# Patient Record
Sex: FEMALE | Race: WHITE | URBAN - METROPOLITAN AREA
[De-identification: names, ages, dates, MRNs, and addresses within clinical notes are randomized per-mention and may not be internally consistent; named-entity substitution may affect disease eponyms.]

---

## 2021-09-24 ENCOUNTER — TELEPHONE (OUTPATIENT)
Dept: ONCOLOGY | Age: 66
End: 2021-09-24

## 2021-09-24 NOTE — TELEPHONE ENCOUNTER
Lm with patient to let her know her appointment has been moved to 9/29/21 at 3:00pm per Dr. Sun Nolan. Gave her office number to call back with any questions.

## 2021-09-28 ENCOUNTER — TELEPHONE (OUTPATIENT)
Dept: ONCOLOGY | Age: 66
End: 2021-09-28

## 2021-09-28 NOTE — TELEPHONE ENCOUNTER
Lm with patient to let her know her appointment has been rescheduled for 10/12/21 at 1:00pm per Dr. Nevaeh Figueroa. Gave her office number to call back with any questions.

## 2021-10-12 ENCOUNTER — OFFICE VISIT (OUTPATIENT)
Dept: ONCOLOGY | Age: 66
End: 2021-10-12
Payer: MEDICARE

## 2021-10-12 VITALS
RESPIRATION RATE: 18 BRPM | HEIGHT: 65 IN | DIASTOLIC BLOOD PRESSURE: 94 MMHG | OXYGEN SATURATION: 98 % | WEIGHT: 171 LBS | BODY MASS INDEX: 28.49 KG/M2 | SYSTOLIC BLOOD PRESSURE: 179 MMHG | HEART RATE: 70 BPM | TEMPERATURE: 98 F

## 2021-10-12 DIAGNOSIS — C50.911 MALIGNANT NEOPLASM OF RIGHT BREAST IN FEMALE, ESTROGEN RECEPTOR POSITIVE, UNSPECIFIED SITE OF BREAST (HCC): Primary | ICD-10-CM

## 2021-10-12 DIAGNOSIS — Z17.0 MALIGNANT NEOPLASM OF RIGHT BREAST IN FEMALE, ESTROGEN RECEPTOR POSITIVE, UNSPECIFIED SITE OF BREAST (HCC): Primary | ICD-10-CM

## 2021-10-12 PROCEDURE — G8427 DOCREV CUR MEDS BY ELIG CLIN: HCPCS | Performed by: INTERNAL MEDICINE

## 2021-10-12 PROCEDURE — 99205 OFFICE O/P NEW HI 60 MIN: CPT | Performed by: INTERNAL MEDICINE

## 2021-10-12 PROCEDURE — 1101F PT FALLS ASSESS-DOCD LE1/YR: CPT | Performed by: INTERNAL MEDICINE

## 2021-10-12 PROCEDURE — 3017F COLORECTAL CA SCREEN DOC REV: CPT | Performed by: INTERNAL MEDICINE

## 2021-10-12 PROCEDURE — G8510 SCR DEP NEG, NO PLAN REQD: HCPCS | Performed by: INTERNAL MEDICINE

## 2021-10-12 PROCEDURE — G8419 CALC BMI OUT NRM PARAM NOF/U: HCPCS | Performed by: INTERNAL MEDICINE

## 2021-10-12 PROCEDURE — G8536 NO DOC ELDER MAL SCRN: HCPCS | Performed by: INTERNAL MEDICINE

## 2021-10-12 PROCEDURE — G8400 PT W/DXA NO RESULTS DOC: HCPCS | Performed by: INTERNAL MEDICINE

## 2021-10-12 PROCEDURE — 1090F PRES/ABSN URINE INCON ASSESS: CPT | Performed by: INTERNAL MEDICINE

## 2021-10-12 PROCEDURE — G0463 HOSPITAL OUTPT CLINIC VISIT: HCPCS | Performed by: INTERNAL MEDICINE

## 2021-10-12 RX ORDER — LEVOCETIRIZINE DIHYDROCHLORIDE 5 MG/1
TABLET, FILM COATED ORAL
COMMUNITY

## 2021-10-12 RX ORDER — ANASTROZOLE 1 MG/1
1 TABLET ORAL DAILY
Qty: 90 TABLET | Refills: 3 | Status: SHIPPED | OUTPATIENT
Start: 2021-10-12

## 2021-10-12 RX ORDER — SIMVASTATIN 10 MG/1
TABLET, FILM COATED ORAL
COMMUNITY

## 2021-10-12 RX ORDER — LISINOPRIL 10 MG/1
20 TABLET ORAL DAILY
COMMUNITY

## 2021-10-12 NOTE — PROGRESS NOTES
[facility] [place] [facility]  [address] [facility], [address]  Eduardo [phone]  F: [phone]      Reason for Visit:   Elle Mcmahan is a 78 y.o. female who is seen in consultation at the request of Dr. Alysha Van for evaluation of therapy for breast cancer    Rad onc:  Dr. Melany Caba    Treatment History:   · 8/10/21 right breast UOQ, core bx:  DCIS, 9 mm, gr 2, solid, cribriform, papillary, ER + at 100%, [place] + at 100%  · 21:  Right breast lumpectomy:  IDC, 4 mm, gr 2, ER + at 100%, WV + at 90%, HER 2 negative at IHC 0, ki67 20%; 6 mm DCIS, gr 2; pT1a cN0 cM0    History of Present Illness: An abnormal mammogram led to the pathology above. FH:  Mother with breast cancer at age 58; no ovarian, prostate, pancreas cancer; Father with colon cancer    Past Medical History:   Diagnosis Date    Cancer [facility])       History reviewed. No pertinent surgical history. Social History     Tobacco Use    Smoking status: Never Smoker    Smokeless tobacco: Never Used   Substance Use Topics    Alcohol use: Yes      History reviewed. No pertinent family history. Current Outpatient Medications   Medication Sig    lisinopriL (PRINIVIL, ZESTRIL) 10 mg tablet Take  by mouth daily.  simvastatin (ZOCOR) 10 mg tablet Take  by mouth nightly.  tumeric-ging-olive-oreg-capryl 100 mg-150 mg- 50 mg-150 mg cap Take  by mouth.  levocetirizine (Xyzal) 5 mg tablet Take  by mouth.  aspirin 81 mg cap Take  by mouth.  cholecalciferol, vitamin D3, (VITAMIN D3 PO) Take  by mouth. No current facility-administered medications for this visit. Not on File     Review of Systems: A complete review of systems was obtained, negative except as described above.     Physical Exam:     Visit Vitals  Temp 98 °F (36.7 °C) (Temporal)   Resp 18   Ht 5' 5\" (1.651 m)   Wt 171 lb (77.6 kg)   BMI 28.46 kg/m²     ECOG PS: 0    Constitutional: Appears well-developed and well-nourished in no apparent distress      Mental status: Alert and awake, Oriented to person/place/time, Able to follow commands    Eyes: EOM normal, Sclera normal, No visible ocular discharge    HENT: Normocephalic, atraumatic    Mouth/Throat: Moist mucous membranes    External Ears normal    Neck: No visualized mass    Pulmonary/Chest: Respiratory effort normal, No visualized signs of difficulty breathing or respiratory distress    Musculoskeletal: Normal gait with no signs of ataxia, Normal range of motion of neck    Neurological: No facial asymmetry (Cranial nerve 7 motor function), No gaze palsy    Skin: No significant exanthematous lesions or discoloration noted on facial skin    Psychiatric: Normal affect, No hallucinations       Results:   No results found for: WBC, HGB, HCT, PLT, MCV, ANEU, HGBPOC, HCTPOC, HGBEXT, HCTEXT, PLTEXT  No results found for: NA, K, CL, CO2, GLU, BUN, CREA, GFRAA, GFRNA, CA, NAPOC, KPOCT, CLPOC, GLUCPOC, IBUN, CREAPOC, ICAI  No results found for: TBILI, ALT, AP, TP, ALB, GLOB      8/30/21 breast MRI  Enhancement 3.2 cm lateral central aspect of right breast; 0.5 cm focus  1 cm medial to the known malignancy; no evident LAD    Records reviewed and summarized above. Pathology report(s) reviewed above. Radiology report(s) reviewed above. Assessment/plan:   1. Right breast IDC, gr 2, ER +, ME +, HER 2 negative, 4 mm, pT1a cN0 cM0, stage IA, both anatomic and prognostic    We explained to the patient that the goal of systemic adjuvant therapy is to improve the chances for cure and decrease the risk of relapse. We explained why a patient can have microscopic cancer spread now even though physical examination, laboratory studies and imaging studies are negative for cancer. We explained that the same treatments used now as adjuvant or preventive treatments rarely if ever are curative in women who develop metastases. Using [email or web address] org, her 5 year all cause mortality risk is 6-8%; her 10 year all cause mortality risk is 15-23%; her median OS is 17-21 years. An adjuvant discussion is warranted. She would not have met the [facility]SO guidelines for choosing wisely and avoiding SLN bx as she is not yet 78years old (and she is very active). Note bene:  Not done at the time of surgery as the 5 mm lesion on MRI appeared to be a satellite of the known DCIS. Discussed with her that while it is likely that the odds of she having disease in her SLN is low (< 10%), if her LN were +, it would drastically change our conversation. With her ki67 not being low, I would recommend a mammaprint if her SLN were + (not otherwise indicated for a 4 mm tumor and unlikely to be paid for by insurance). If her disease were LN + and her mammaprint high risk, she then would be a candidate for chemotherapy. She would like to speak to Dr. Rachel Guo about her opinion and the details of a SLN bx surgery, discussed with Dr. Rachel Guo and Dr. Beckie Rivera. Assuming her SLN bx is negative, or she declines SLN bx, she will proceed with XRT as outlined in Dr. Sajan Carrillo note (reviewed). She then will proceed with endocrine therapy. The risks and benefits of tamoxifen were discussed in detail and the patient was informed of the following: Risks include a 1% risk of endometrial cancer for postmenopausal women treated for five years but no (or a minimally increased) risk in premenopausal women and that most women who develop tamoxifen-associated endometrial cancer can be cured. Any bleeding in a postmenopausal woman should be reported to a health care professional. There is also a 1% risk of blood clots (thromboembolism) that can be fatal. All patients irrespective of age who take tamoxifen and who have not had a hysterectomy should have a pelvic exam and Pap smear yearly. Tamoxifen increases the risk of cataract formation and on rare occasions has caused retinal damage: an eye exam is recommended yearly.  Other risks include vaginal discharge or dryness, the development or worsening of hot flashes or vasomotor symptoms, and bone loss in premenopausal women. There is excellent evidence that tamoxifen does not increase risk of depression, cause weight gain or have a major effect on sexual function. Available data suggests little or no effect on cognitive function. Benefits include a lowering of cholesterol and a reduction in the rate of bone loss for postmenopausal woman. Any other symptoms should be reported. The risks and benefits of aromatase inhibitors (anastrozole, letrozole, and exemestane) were discussed in detail and the patient was informed of the following: Risks include the development of painful muscles and joints (arthralgia/myalgia) and bone loss. Muscle and joint pain can be severe but rarely result in any tissue damage; symptoms usually resolve in several weeks when the medication is stopped. Bone loss is common and a bone density test is recommended as a baseline and then yearly to every several years depending on initial results. The risk of fractures is increased by a few percent in patients taking these drugs, but careful monitoring of bone density and using bone protecting agents when indicated can minimize these risks. Unlike tamoxifen there is no increased risk of blood clots or endometrial cancer. AIs can cause or worsen vaginal dryness but women using these drugs should not use vaginal estrogen preparations for these symptoms. AIs can also cause or increase hot flashes. Any other symptoms should be reported. After this discussion, she is agreeable to anastrozole 1 mg daily, rx in. To start 1 week after the completion of XRT. dexa done 2-3 years ago with Dr. Zulema Robins, will get records    Follow-up after early breast cancer was discussed. I recommend follow-up as defined by the American Society of Clinical Oncology and [facility].  This includes a visit to a health care professional every 3-6 months for 3 years, then every 6-12 months for 2 years, and then yearly as well as mammograms yearly. 2. Emotional well being:  She has excellent support and is coping well with her disease    3. Genetic testing:    Result pending from Dr. Faye Browne office    70+ minutes were spent in this patient's encounter including in face to face discussion, outside record review, and separate conversations with Willa Ellsworth and Jacek    I appreciate the opportunity to participate in Ms. Nhung Roche's care. Signed By: Hailey Andrews MD      No orders of the defined types were placed in this encounter.

## 2021-10-12 NOTE — PATIENT INSTRUCTIONS
Anastrozole (By mouth)   Anastrozole (an-AS-troe-zole)  Treats breast cancer. Brand Name(s): Arimidex   There may be other brand names for this medicine. When This Medicine Should Not Be Used: This medicine is not right for everyone. Do not use it if you had an allergic reaction to anastrozole, if you are pregnant, or if you have not stopped menstruating (premenopausal). How to Use This Medicine:   Tablet  · Medicines used to treat cancer are very strong and can have many side effects. Before receiving this medicine, make sure you understand all the risks and benefits. It is important for you to work closely with your doctor during your treatment. · Your doctor will tell you how much medicine to use. Do not use more than directed. · Read and follow the patient instructions that come with this medicine. Talk to your doctor or pharmacist if you have any questions. · Missed dose: Take a dose as soon as you remember. If it is almost time for your next dose, wait until then and take a regular dose. Do not take extra medicine to make up for a missed dose. · If you vomit after taking your medicine, call your doctor or pharmacist for instructions. · Store the medicine in a closed container at room temperature, away from heat, moisture, and direct light. · Ask your pharmacist, doctor, or health caregiver about the best way to dispose of any leftover medicine after you have finished your treatment. You will also need to throw away old medicine after the expiration date has passed. Drugs and Foods to Avoid:   Ask your doctor or pharmacist before using any other medicine, including over-the-counter medicines, vitamins, and herbal products. · Some medicines can affect how anastrozole works.  Tell your doctor if you are taking any of the following:   ¨ Medicine that contains estrogen  ¨ Tamoxifen  Warnings While Using This Medicine:   · Pregnancy after menopause is not likely, but if you think you could be pregnant, tell your doctor. This medicine could harm an unborn baby. · Tell your doctor if you are breastfeeding, or if you have liver disease, bone problems (such as osteoporosis), high cholesterol, or heart disease. · This medicine may cause the following problems:   ¨ Increased risk for weak bones or osteoporosis  ¨ Increased cholesterol levels  ¨ Increased risk for heart attack or stroke  · Your doctor will do lab tests at regular visits to check on the effects of this medicine. Keep all appointments. · Cancer medicine can cause nausea or vomiting, sometimes even after you receive medicine to prevent these effects. Ask your doctor or nurse about other ways to control any nausea or vomiting that might happen. · Keep all medicine out of the reach of children. Never share your medicine with anyone. Possible Side Effects While Using This Medicine:   Call your doctor right away if you notice any of these side effects:  · Allergic reaction: Itching or hives, swelling in your face or hands, swelling or tingling in your mouth or throat, chest tightness, trouble breathing  · Back, bone, joint, or muscle pain  · Blistering, peeling, or red skin rash  · Chest pain or shortness of breath  · Fever, chills, cough, sore throat, and body aches  · Numbness, tingling, or burning pain in your hands, arms, legs, or feet  · Rapid weight gain, or swelling in your hands, ankles, or feet  · Severe diarrhea, nausea, or vomiting  · Vaginal bleeding or discharge  · Yellowing of your skin or the whites of your eyes  If you notice these less serious side effects, talk with your doctor:   · Breast pain  · Dizziness, headache, tiredness, or weakness  · Mood changes, anxiety, or irritability  · Trouble sleeping  · Warmth or redness in your face, neck, arms, or upper chest  If you notice other side effects that you think are caused by this medicine, tell your doctor. Call your doctor for medical advice about side effects.  You may report side effects to FDA at 9-171-FDA-0890  © 2017 [facility] Information is for End User's use only and may not be sold, redistributed or otherwise used for commercial purposes. The above information is an  only. It is not intended as medical advice for individual conditions or treatments. Talk to your doctor, nurse or pharmacist before following any medical regimen to see if it is safe and effective for you.

## 2021-10-21 ENCOUNTER — TELEPHONE (OUTPATIENT)
Dept: ONCOLOGY | Age: 66
End: 2021-10-21

## 2021-10-21 NOTE — TELEPHONE ENCOUNTER
Patient called and stated that she wants to move forward with the procedure for her Lymph nodes but she hasn't been able to get in touch with Dr. Romaine Bergman office to set up an appointment.  Please advise       CB# [phone]

## 2021-10-21 NOTE — TELEPHONE ENCOUNTER
10/21/21 10:38 AM: Called [facility] and advised that the patient would like to move forward with SLN biopsy surgery but has not been able to get in touch with their office. Representative stated she will call the patient today to get her scheduled. 10/21/21 10:56 AM: Called patient, who stated that Dr. Tony Joaquin office called her and she has an appointment with them on 10/25. Patient did not have any questions or concerns at this time.

## 2021-11-24 ENCOUNTER — HOSPITAL ENCOUNTER (OUTPATIENT)
Dept: RADIATION THERAPY | Age: 66
Discharge: HOME | End: 2021-11-24

## 2021-11-24 VITALS — BODY MASS INDEX: 27.99 KG/M2 | HEIGHT: 65 IN | WEIGHT: 168 LBS

## 2022-01-28 ENCOUNTER — TELEPHONE (OUTPATIENT)
Dept: ONCOLOGY | Age: 67
End: 2022-01-28

## 2022-01-28 NOTE — TELEPHONE ENCOUNTER
Lm with patient to get her scheduled for a follow up in the next month or two. Gave her office number to call back.

## 2022-02-16 ENCOUNTER — OFFICE VISIT (OUTPATIENT)
Dept: ONCOLOGY | Age: 67
End: 2022-02-16
Payer: MEDICARE

## 2022-02-16 VITALS
HEART RATE: 71 BPM | DIASTOLIC BLOOD PRESSURE: 98 MMHG | TEMPERATURE: 98 F | SYSTOLIC BLOOD PRESSURE: 173 MMHG | RESPIRATION RATE: 18 BRPM | WEIGHT: 177.4 LBS | OXYGEN SATURATION: 99 % | BODY MASS INDEX: 29.56 KG/M2 | HEIGHT: 65 IN

## 2022-02-16 DIAGNOSIS — Z17.0 MALIGNANT NEOPLASM OF RIGHT BREAST IN FEMALE, ESTROGEN RECEPTOR POSITIVE, UNSPECIFIED SITE OF BREAST (HCC): Primary | ICD-10-CM

## 2022-02-16 DIAGNOSIS — C50.911 MALIGNANT NEOPLASM OF RIGHT BREAST IN FEMALE, ESTROGEN RECEPTOR POSITIVE, UNSPECIFIED SITE OF BREAST (HCC): Primary | ICD-10-CM

## 2022-02-16 PROCEDURE — G8432 DEP SCR NOT DOC, RNG: HCPCS | Performed by: INTERNAL MEDICINE

## 2022-02-16 PROCEDURE — G8536 NO DOC ELDER MAL SCRN: HCPCS | Performed by: INTERNAL MEDICINE

## 2022-02-16 PROCEDURE — G8419 CALC BMI OUT NRM PARAM NOF/U: HCPCS | Performed by: INTERNAL MEDICINE

## 2022-02-16 PROCEDURE — G8400 PT W/DXA NO RESULTS DOC: HCPCS | Performed by: INTERNAL MEDICINE

## 2022-02-16 PROCEDURE — 99213 OFFICE O/P EST LOW 20 MIN: CPT | Performed by: INTERNAL MEDICINE

## 2022-02-16 PROCEDURE — G8427 DOCREV CUR MEDS BY ELIG CLIN: HCPCS | Performed by: INTERNAL MEDICINE

## 2022-02-16 PROCEDURE — G0463 HOSPITAL OUTPT CLINIC VISIT: HCPCS | Performed by: NURSE PRACTITIONER

## 2022-02-16 PROCEDURE — 1101F PT FALLS ASSESS-DOCD LE1/YR: CPT | Performed by: INTERNAL MEDICINE

## 2022-02-16 PROCEDURE — 3017F COLORECTAL CA SCREEN DOC REV: CPT | Performed by: INTERNAL MEDICINE

## 2022-02-16 PROCEDURE — 1090F PRES/ABSN URINE INCON ASSESS: CPT | Performed by: INTERNAL MEDICINE

## 2022-02-16 NOTE — PROGRESS NOTES
Cancer [place] at [place] [address] [facility] [facility] [address] [address] [facility]  Liliana Barker: [phone]  F: [phone]      Reason for Visit:   Justin Christine is a 78 y.o. female who is seen in consultation at the request of Dr. Jayden Byrne for evaluation of therapy for breast cancer    Rad onc:  Dr. Stephanie Lizarraga    Treatment History:   · 8/10/21 right breast UOQ, core bx:  DCIS, 9 mm, gr 2, solid, cribriform, papillary, ER + at 100%, [place] + at 100%  · 9/7/21:  Right breast lumpectomy:  IDC, 4 mm, gr 2, ER + at 100%, MA + at 90%, HER 2 negative at IHC 0, ki67 20%; 6 mm DCIS, gr 2; pT1a cN0 cM0  · 11/5/21 right SLN bx:  0/5  · oncotype Dx = 7; RR 3%  · S/p XRT 1/3/22  · Anastrozole 1/17/22    History of Present Illness: An abnormal mammogram led to the pathology above. Interval history:  Gr 1 fatigue    FH: Mother with breast cancer at age 58; no ovarian, prostate, pancreas cancer; Father with colon cancer    Past Medical History:   Diagnosis Date    Cancer [facility])       History reviewed. No pertinent surgical history. Social History     Tobacco Use    Smoking status: Never Smoker    Smokeless tobacco: Never Used   Substance Use Topics    Alcohol use: Yes      History reviewed. No pertinent family history. Current Outpatient Medications   Medication Sig    lisinopriL (PRINIVIL, ZESTRIL) 10 mg tablet Take 20 mg by mouth daily.  simvastatin (ZOCOR) 10 mg tablet Take  by mouth nightly.  tumeric-ging-olive-oreg-capryl 100 mg-150 mg- 50 mg-150 mg cap Take  by mouth.  levocetirizine (Xyzal) 5 mg tablet Take  by mouth.  aspirin 81 mg cap Take  by mouth.  cholecalciferol, vitamin D3, (VITAMIN D3 PO) Take  by mouth.  anastrozole (ARIMIDEX) 1 mg tablet Take 1 mg by mouth daily. No current facility-administered medications for this visit. No Known Allergies     Review of Systems: A complete review of systems was obtained, negative except as described above.     Physical Exam: Visit Vitals  BP (!) 173/98 Comment: Pt. took BP med this AM and reports being asymptomatic. Pulse 71   Temp 98 °F (36.7 °C) (Temporal)   Resp 18   Ht 5' 5\" (1.651 m)   Wt 177 lb 6.4 oz (80.5 kg)   SpO2 99%   BMI 29.52 kg/m²     ECOG PS: 0    Constitutional: Appears well-developed and well-nourished in no apparent distress      Mental status: Alert and awake, Oriented to person/place/time, Able to follow commands    Eyes: EOM normal, Sclera normal, No visible ocular discharge    HENT: Normocephalic, atraumatic    Mouth/Throat: Moist mucous membranes    External Ears normal    Neck: No visualized mass    Pulmonary/Chest: Respiratory effort normal, No visualized signs of difficulty breathing or respiratory distress    Musculoskeletal: Normal gait with no signs of ataxia, Normal range of motion of neck    Neurological: No facial asymmetry (Cranial nerve 7 motor function), No gaze palsy    Skin: No significant exanthematous lesions or discoloration noted on facial skin    Psychiatric: Normal affect, No hallucinations       Results:   No results found for: WBC, HGB, HCT, PLT, MCV, ANEU, HGBPOC, HCTPOC, HGBEXT, HCTEXT, PLTEXT, HGBEXT, HCTEXT, PLTEXT  No results found for: NA, K, CL, CO2, GLU, BUN, CREA, GFRAA, GFRNA, CA, NAPOC, KPOCT, CLPOC, GLUCPOC, IBUN, CREAPOC, ICAI  No results found for: TBILI, ALT, AP, TP, ALB, GLOB      8/30/21 breast MRI  Enhancement 3.2 cm lateral central aspect of right breast; 0.5 cm focus  1 cm medial to the known malignancy; no evident LAD    6/11/18 dexa  Normal  L spine T 0.4  L fem neck T -0.9  R fem neck T -0.8      Records reviewed and summarized above. Pathology report(s) reviewed above. Radiology report(s) reviewed above. Assessment/plan:   1.  Right breast IDC, gr 2, ER +, [place] +, HER 2 negative, 4 mm,0/5 LN,  pT1a pN0 cM0, stage IA, both anatomic and prognostic    We explained to the patient that the goal of systemic adjuvant therapy is to improve the chances for cure and decrease the risk of relapse. We explained why a patient can have microscopic cancer spread now even though physical examination, laboratory studies and imaging studies are negative for cancer. We explained that the same treatments used now as adjuvant or preventive treatments rarely if ever are curative in women who develop metastases. Using eprognosis. org, her 5 year all cause mortality risk is 6-8%; her 10 year all cause mortality risk is 15-23%; her median OS is 17-21 years. An adjuvant discussion is warranted. AMAURI, continue anastrozole 1 mg daily, rx in.     dexa done in 2018 with Dr. David Perdue, was normal, she will repeat with Dr. David Perdue this year    She will see Dr. Cathie Winter in march    Follow-up after early breast cancer was discussed. I recommend follow-up as defined by the American Society of Clinical Oncology and [facility]. This includes a visit to a health care professional every 3-6 months for 3 years, then every 6-12 months for 2 years, and then yearly as well as mammograms yearly. 2. Emotional well being:  She has excellent support and is coping well with her disease    3. Genetic testing: Will get result from Dr. Chani Allen office        I appreciate the opportunity to participate in Ms. Fernanda Roche's care. Signed By: Nhung Markham MD      No orders of the defined types were placed in this encounter.

## 2022-02-16 NOTE — PROGRESS NOTES
[facility] is a 78 y.o. female Follow up for the evaluation of breast cancer. 1. Have you been to the ER, urgent care clinic since your last visit? Hospitalized since your last visit? No    2. Have you seen or consulted any other health care providers outside of the [address] [place] [address] since your last visit? Include any pap smears or colon screening.  No

## 2022-03-18 PROBLEM — C50.911 MALIGNANT NEOPLASM OF RIGHT BREAST IN FEMALE, ESTROGEN RECEPTOR POSITIVE (HCC): Status: ACTIVE | Noted: 2021-10-12

## 2022-03-18 PROBLEM — Z17.0 MALIGNANT NEOPLASM OF RIGHT BREAST IN FEMALE, ESTROGEN RECEPTOR POSITIVE (HCC): Status: ACTIVE | Noted: 2021-10-12

## 2022-08-24 ENCOUNTER — OFFICE VISIT (OUTPATIENT)
Dept: ONCOLOGY | Age: 67
End: 2022-08-24
Payer: MEDICARE

## 2022-08-24 VITALS
WEIGHT: 179 LBS | RESPIRATION RATE: 18 BRPM | DIASTOLIC BLOOD PRESSURE: 85 MMHG | SYSTOLIC BLOOD PRESSURE: 169 MMHG | OXYGEN SATURATION: 97 % | HEIGHT: 65 IN | HEART RATE: 79 BPM | TEMPERATURE: 98 F | BODY MASS INDEX: 29.82 KG/M2

## 2022-08-24 DIAGNOSIS — C50.911 MALIGNANT NEOPLASM OF RIGHT BREAST IN FEMALE, ESTROGEN RECEPTOR POSITIVE, UNSPECIFIED SITE OF BREAST (HCC): Primary | ICD-10-CM

## 2022-08-24 DIAGNOSIS — Z17.0 MALIGNANT NEOPLASM OF RIGHT BREAST IN FEMALE, ESTROGEN RECEPTOR POSITIVE, UNSPECIFIED SITE OF BREAST (HCC): Primary | ICD-10-CM

## 2022-08-24 PROCEDURE — 1101F PT FALLS ASSESS-DOCD LE1/YR: CPT | Performed by: INTERNAL MEDICINE

## 2022-08-24 PROCEDURE — 1123F ACP DISCUSS/DSCN MKR DOCD: CPT | Performed by: INTERNAL MEDICINE

## 2022-08-24 PROCEDURE — G8432 DEP SCR NOT DOC, RNG: HCPCS | Performed by: INTERNAL MEDICINE

## 2022-08-24 PROCEDURE — 99213 OFFICE O/P EST LOW 20 MIN: CPT | Performed by: INTERNAL MEDICINE

## 2022-08-24 PROCEDURE — G0463 HOSPITAL OUTPT CLINIC VISIT: HCPCS | Performed by: INTERNAL MEDICINE

## 2022-08-24 PROCEDURE — G8419 CALC BMI OUT NRM PARAM NOF/U: HCPCS | Performed by: INTERNAL MEDICINE

## 2022-08-24 PROCEDURE — G8536 NO DOC ELDER MAL SCRN: HCPCS | Performed by: INTERNAL MEDICINE

## 2022-08-24 PROCEDURE — G8427 DOCREV CUR MEDS BY ELIG CLIN: HCPCS | Performed by: INTERNAL MEDICINE

## 2022-08-24 PROCEDURE — G8400 PT W/DXA NO RESULTS DOC: HCPCS | Performed by: INTERNAL MEDICINE

## 2022-08-24 PROCEDURE — 3017F COLORECTAL CA SCREEN DOC REV: CPT | Performed by: INTERNAL MEDICINE

## 2022-08-24 PROCEDURE — 1090F PRES/ABSN URINE INCON ASSESS: CPT | Performed by: INTERNAL MEDICINE

## 2022-08-24 NOTE — PROGRESS NOTES
[facility] [place] [address] [facility], [address] [facility]  W: [phone]  F: [phone]      Reason for Visit:   Braeden Bell is a 75 y.o. female who is seen in follow up for breast cancer    Breast Surgeon: Dr. Mary Ellen Holguin onc:  Dr. Susana Hill    Treatment History:   8/10/21 right breast UOQ, core bx:  DCIS, 9 mm, gr 2, solid, cribriform, papillary, ER + at 100%, IA + at 100%  9/7/21:  Right breast lumpectomy:  IDC, 4 mm, gr 2, ER + at 100%, IA + at 90%, HER 2 negative at IHC 0, ki67 20%; 6 mm DCIS, gr 2; pT1a cN0 cM0  11/5/21 right SLN bx:  0/5  oncotype Dx = 7; RR 3%  S/p XRT 1/3/22  Anastrozole 1/17/22 - current    History of Present Illness: An abnormal mammogram led to the pathology above. Interval history:  Patient presents today for follow up on anastrozole. Reports gr 1 fatigue, gr 1 hot flashes, gr 1 insomnia. FH:  Mother with breast cancer at age 58; no ovarian, prostate, pancreas cancer; Father with colon cancer    Past Medical History:   Diagnosis Date    Cancer [facility])       History reviewed. No pertinent surgical history. Social History     Tobacco Use    Smoking status: Never    Smokeless tobacco: Never   Substance Use Topics    Alcohol use: Yes      History reviewed. No pertinent family history. Current Outpatient Medications   Medication Sig    OTHER     lisinopriL (PRINIVIL, ZESTRIL) 10 mg tablet Take 20 mg by mouth daily. simvastatin (ZOCOR) 10 mg tablet Take  by mouth nightly. tumeric-ging-olive-oreg-capryl 100 mg-150 mg- 50 mg-150 mg cap Take  by mouth.    levocetirizine (XYZAL) 5 mg tablet Take  by mouth. aspirin 81 mg cap Take  by mouth. cholecalciferol, vitamin D3, (VITAMIN D3 PO) Take  by mouth. anastrozole (ARIMIDEX) 1 mg tablet Take 1 mg by mouth daily. No current facility-administered medications for this visit.       No Known Allergies     Review of Systems: A complete review of systems was obtained, negative except as described above. Physical Exam:     Visit Vitals  BP (!) 169/85 Comment: Pt. took BP med this AM and reports being asymptomatic. Pulse 79   Temp 98 °F (36.7 °C) (Temporal)   Resp 18   Ht 5' 5\" (1.651 m)   Wt 179 lb (81.2 kg)   SpO2 97%   BMI 29.79 kg/m²     ECOG PS: 0    Constitutional: Appears well-developed and well-nourished in no apparent distress      Mental status: Alert and awake, Oriented to person/place/time, Able to follow commands    Eyes: EOM normal, Sclera normal, No visible ocular discharge    HENT: Normocephalic, atraumatic    Mouth/Throat: Moist mucous membranes    External Ears normal    Neck: No visualized mass    Pulmonary/Chest: Respiratory effort normal, No visualized signs of difficulty breathing or respiratory distress    Musculoskeletal: Normal gait with no signs of ataxia, Normal range of motion of neck    Neurological: No facial asymmetry (Cranial nerve 7 motor function), No gaze palsy    Skin: No significant exanthematous lesions or discoloration noted on facial skin    Psychiatric: Normal affect, No hallucinations       Results:   No results found for: WBC, HGB, HCT, PLT, MCV, ANEU, HGBPOC, HCTPOC, HGBEXT, HCTEXT, PLTEXT, HGBEXT, HCTEXT, PLTEXT  No results found for: NA, K, CL, CO2, GLU, BUN, CREA, GFRAA, GFRNA, CA, NAPOC, KPOCT, CLPOC, GLUCPOC, IBUN, CREAPOC, ICAI  No results found for: TBILI, ALT, AP, TP, ALB, GLOB      8/30/21 breast MRI  Enhancement 3.2 cm lateral central aspect of right breast; 0.5 cm focus  1 cm medial to the known malignancy; no evident LAD    6/11/18 dexa  Normal  L spine T 0.4  L fem neck T -0.9  R fem neck T -0.8      Records reviewed and summarized above. Pathology report(s) reviewed above. Radiology report(s) reviewed above. Assessment/plan:   1.  Right breast IDC, gr 2, ER +, [place] +, HER 2 negative, 4 mm,0/5 LN,  pT1a pN0 cM0, stage IA, both anatomic and prognostic    We explained to the patient that the goal of systemic adjuvant therapy is to improve the chances for cure and decrease the risk of relapse. We explained why a patient can have microscopic cancer spread now even though physical examination, laboratory studies and imaging studies are negative for cancer. We explained that the same treatments used now as adjuvant or preventive treatments rarely if ever are curative in women who develop metastases. Using eprognosis. org, her 5 year all cause mortality risk is 6-8%; her 10 year all cause mortality risk is 15-23%; her median OS is 17-21 years. An adjuvant discussion is warranted. AMAURI, continue anastrozole 1 mg daily. Mild joint pain, tolerable    dexa done in 2018 with Dr. Maegan Lamar, was normal, she had this year, will get records from [address] [place] [address] negative with Dr. Noble Velasquez 7/12/22    Follow-up after early breast cancer was discussed. I recommend follow-up as defined by the American Society of Clinical Oncology and [facility]. This includes a visit to a health care professional every 3-6 months for 3 years, then every 6-12 months for 2 years, and then yearly as well as mammograms yearly. 2. Emotional well being:  She has excellent support and is coping well with her disease    3. Genetic testing: Will get result from Dr. Valeria Sandhu office      I appreciate the opportunity to participate in Ms. Jude Roche's care. Signed By: Leslie Richard MD      No orders of the defined types were placed in this encounter.

## 2022-08-24 NOTE — PROGRESS NOTES
Artie Ninaler is a 75 y.o. female Follow up for the evaluation of breast cancer. 1. Have you been to the ER, urgent care clinic since your last visit? Hospitalized since your last visit? No    2. Have you seen or consulted any other health care providers outside of the [address] [place] [address] since your last visit? Include any pap smears or colon screening.  No

## 2022-12-22 RX ORDER — ANASTROZOLE 1 MG/1
TABLET ORAL
Qty: 90 TABLET | Refills: 3 | Status: SHIPPED | OUTPATIENT
Start: 2022-12-22

## 2023-05-18 ENCOUNTER — TELEPHONE (OUTPATIENT)
Age: 68
End: 2023-05-18

## 2023-06-28 ENCOUNTER — OFFICE VISIT (OUTPATIENT)
Age: 68
End: 2023-06-28
Payer: MEDICARE

## 2023-06-28 VITALS
DIASTOLIC BLOOD PRESSURE: 76 MMHG | HEART RATE: 70 BPM | TEMPERATURE: 98.1 F | BODY MASS INDEX: 26.63 KG/M2 | WEIGHT: 160 LBS | SYSTOLIC BLOOD PRESSURE: 157 MMHG | OXYGEN SATURATION: 99 % | RESPIRATION RATE: 16 BRPM

## 2023-06-28 DIAGNOSIS — C50.911 MALIGNANT NEOPLASM OF RIGHT BREAST IN FEMALE, ESTROGEN RECEPTOR POSITIVE, UNSPECIFIED SITE OF BREAST (HCC): Primary | ICD-10-CM

## 2023-06-28 DIAGNOSIS — Z17.0 MALIGNANT NEOPLASM OF RIGHT BREAST IN FEMALE, ESTROGEN RECEPTOR POSITIVE, UNSPECIFIED SITE OF BREAST (HCC): Primary | ICD-10-CM

## 2023-06-28 PROCEDURE — 99214 OFFICE O/P EST MOD 30 MIN: CPT | Performed by: INTERNAL MEDICINE

## 2023-06-28 PROCEDURE — 1123F ACP DISCUSS/DSCN MKR DOCD: CPT | Performed by: INTERNAL MEDICINE

## 2023-06-28 RX ORDER — AMLODIPINE BESYLATE 5 MG/1
5 TABLET ORAL DAILY
COMMUNITY
Start: 2023-05-30

## 2023-06-28 RX ORDER — ANASTROZOLE 1 MG/1
1 TABLET ORAL DAILY
Qty: 90 TABLET | Refills: 3 | Status: SHIPPED | OUTPATIENT
Start: 2023-06-28

## 2024-03-27 ENCOUNTER — OFFICE VISIT (OUTPATIENT)
Age: 69
End: 2024-03-27
Payer: MEDICARE

## 2024-03-27 VITALS
DIASTOLIC BLOOD PRESSURE: 72 MMHG | WEIGHT: 162 LBS | RESPIRATION RATE: 16 BRPM | BODY MASS INDEX: 26.96 KG/M2 | HEART RATE: 62 BPM | SYSTOLIC BLOOD PRESSURE: 136 MMHG | TEMPERATURE: 97.8 F | OXYGEN SATURATION: 98 %

## 2024-03-27 DIAGNOSIS — L65.9 HAIR THINNING: ICD-10-CM

## 2024-03-27 DIAGNOSIS — R23.2 HOT FLASHES RELATED TO AROMATASE INHIBITOR THERAPY: ICD-10-CM

## 2024-03-27 DIAGNOSIS — Z17.0 MALIGNANT NEOPLASM OF RIGHT BREAST IN FEMALE, ESTROGEN RECEPTOR POSITIVE, UNSPECIFIED SITE OF BREAST (HCC): Primary | ICD-10-CM

## 2024-03-27 DIAGNOSIS — T45.1X5A HOT FLASHES RELATED TO AROMATASE INHIBITOR THERAPY: ICD-10-CM

## 2024-03-27 DIAGNOSIS — C50.911 MALIGNANT NEOPLASM OF RIGHT BREAST IN FEMALE, ESTROGEN RECEPTOR POSITIVE, UNSPECIFIED SITE OF BREAST (HCC): Primary | ICD-10-CM

## 2024-03-27 PROCEDURE — 1123F ACP DISCUSS/DSCN MKR DOCD: CPT | Performed by: NURSE PRACTITIONER

## 2024-03-27 PROCEDURE — 99213 OFFICE O/P EST LOW 20 MIN: CPT | Performed by: INTERNAL MEDICINE

## 2024-03-27 PROCEDURE — 99213 OFFICE O/P EST LOW 20 MIN: CPT | Performed by: NURSE PRACTITIONER

## 2024-03-27 RX ORDER — ANASTROZOLE 1 MG/1
1 TABLET ORAL DAILY
Qty: 90 TABLET | Refills: 3 | Status: SHIPPED | OUTPATIENT
Start: 2024-03-27

## 2024-03-27 NOTE — PROGRESS NOTES
[facility]   [address], [address] [facility] [address]   W: [phone]  F: [phone]         Reason for Visit:     Aline Urbina is a 68 y.o.female who is seen in follow up for breast cancer      Breast Surgeon: Dr. Diego   Rad onc:  Dr. Dasilva          Treatment History:      8/10/21 right breast UOQ, core bx:  DCIS, 9 mm, gr 2, solid, cribriform, papillary, ER + at 100%, ND + at 100%    9/7/21:  Right breast lumpectomy:  IDC, 4 mm, gr 2, ER + at 100%, ND + at 90%, HER 2 negative at IHC 0, ki67 20%; 6 mm DCIS, gr 2; pT1a cN0 cM0    11/5/21 right SLN bx:  0/5    oncotype Dx = 7; RR 3%  [employer] genetic testing negative    S/p XRT 1/3/22    Anastrozole 1/17/22 - current       History of Present Illness:     An abnormal mammogram led to the pathology above.      Interval history:  Patient presents today for follow up on anastrozole. Reports gr 1 fatigue, gr 1 hair loss, gr 1 hot flashes, gr 2 insomnia, gr 1 loss of libido.       FH:  Mother with breast cancer at age 62; no ovarian, prostate, pancreas cancer;  Father with colon cancer   Review of systems was obtained and pertinent findings reviewed above. Past medical history, social history, family history, medications, and allergies are located in the electronic medical record.         Physical Exam:        Visit Vitals     Vitals:    03/27/24 0919   BP: 136/72   Pulse: 62   Resp: 16   Temp: 97.8 °F (36.6 °C)   SpO2: 98%        ECOG PS: 0      Constitutional: Appears well-developed and well-nourished in no apparent distress        Mental status: Alert and awake, Oriented to person/place/time, Able to follow commands      Eyes: EOM normal, Sclera normal, No visible ocular discharge      HENT: Normocephalic, atraumatic      Mouth/Throat: Moist mucous membranes      External Ears normal      Neck: No visualized mass      Pulmonary/Chest: Respiratory effort normal, No visualized signs of difficulty breathing or

## 2024-03-27 NOTE — PROGRESS NOTES
Aline Urbina is a 68 y.o. female here today to follow up for breast cancer    1. Have you been to the ER, urgent care clinic since your last visit?  Hospitalized since your last visit?no    2. Have you seen or consulted any other health care providers outside of the [facility] since your last visit?  Include any pap smears or colon screening. no

## 2025-01-06 ENCOUNTER — TELEPHONE (OUTPATIENT)
Age: 70
End: 2025-01-06

## 2025-01-06 ENCOUNTER — OFFICE VISIT (OUTPATIENT)
Age: 70
End: 2025-01-06
Payer: MEDICARE

## 2025-01-06 VITALS
OXYGEN SATURATION: 99 % | SYSTOLIC BLOOD PRESSURE: 146 MMHG | HEART RATE: 65 BPM | HEIGHT: 64 IN | DIASTOLIC BLOOD PRESSURE: 74 MMHG | WEIGHT: 170.4 LBS | TEMPERATURE: 97.9 F | BODY MASS INDEX: 29.09 KG/M2 | RESPIRATION RATE: 16 BRPM

## 2025-01-06 DIAGNOSIS — C50.911 MALIGNANT NEOPLASM OF RIGHT BREAST IN FEMALE, ESTROGEN RECEPTOR POSITIVE, UNSPECIFIED SITE OF BREAST (HCC): ICD-10-CM

## 2025-01-06 DIAGNOSIS — Z17.0 MALIGNANT NEOPLASM OF RIGHT BREAST IN FEMALE, ESTROGEN RECEPTOR POSITIVE, UNSPECIFIED SITE OF BREAST (HCC): ICD-10-CM

## 2025-01-06 PROCEDURE — G8400 PT W/DXA NO RESULTS DOC: HCPCS | Performed by: INTERNAL MEDICINE

## 2025-01-06 PROCEDURE — 1090F PRES/ABSN URINE INCON ASSESS: CPT | Performed by: INTERNAL MEDICINE

## 2025-01-06 PROCEDURE — M1308 PR FLU IMMUNIZE NO ADMIN: HCPCS | Performed by: INTERNAL MEDICINE

## 2025-01-06 PROCEDURE — G2211 COMPLEX E/M VISIT ADD ON: HCPCS | Performed by: INTERNAL MEDICINE

## 2025-01-06 PROCEDURE — 1159F MED LIST DOCD IN RCRD: CPT | Performed by: INTERNAL MEDICINE

## 2025-01-06 PROCEDURE — 99214 OFFICE O/P EST MOD 30 MIN: CPT | Performed by: INTERNAL MEDICINE

## 2025-01-06 PROCEDURE — 1123F ACP DISCUSS/DSCN MKR DOCD: CPT | Performed by: INTERNAL MEDICINE

## 2025-01-06 PROCEDURE — 1036F TOBACCO NON-USER: CPT | Performed by: INTERNAL MEDICINE

## 2025-01-06 PROCEDURE — G8419 CALC BMI OUT NRM PARAM NOF/U: HCPCS | Performed by: INTERNAL MEDICINE

## 2025-01-06 PROCEDURE — G8427 DOCREV CUR MEDS BY ELIG CLIN: HCPCS | Performed by: INTERNAL MEDICINE

## 2025-01-06 PROCEDURE — 3017F COLORECTAL CA SCREEN DOC REV: CPT | Performed by: INTERNAL MEDICINE

## 2025-01-06 PROCEDURE — 1160F RVW MEDS BY RX/DR IN RCRD: CPT | Performed by: INTERNAL MEDICINE

## 2025-01-06 PROCEDURE — 1126F AMNT PAIN NOTED NONE PRSNT: CPT | Performed by: INTERNAL MEDICINE

## 2025-01-06 RX ORDER — ANASTROZOLE 1 MG/1
1 TABLET ORAL DAILY
Qty: 90 TABLET | Refills: 3 | Status: SHIPPED | OUTPATIENT
Start: 2025-01-06

## 2025-01-06 ASSESSMENT — PATIENT HEALTH QUESTIONNAIRE - PHQ9
SUM OF ALL RESPONSES TO PHQ QUESTIONS 1-9: 0
SUM OF ALL RESPONSES TO PHQ QUESTIONS 1-9: 0
1. LITTLE INTEREST OR PLEASURE IN DOING THINGS: NOT AT ALL
SUM OF ALL RESPONSES TO PHQ QUESTIONS 1-9: 0
SUM OF ALL RESPONSES TO PHQ9 QUESTIONS 1 & 2: 0
2. FEELING DOWN, DEPRESSED OR HOPELESS: NOT AT ALL
SUM OF ALL RESPONSES TO PHQ QUESTIONS 1-9: 0

## 2025-01-06 NOTE — PROGRESS NOTES
Aline Urbina is a 69 y.o. female is here today for a breast cancer follow up.    1. Have you been to the ER, urgent care clinic since your last visit?  Hospitalized since your last visit?No    2. Have you seen or consulted any other health care providers outside of the [facility] since your last visit?  Include any pap smears or colon screening. No   Vit d 5000iu  Calcium 600mg 500-vit d  
with no signs of ataxia, Normal range of motion of neck      Neurological: No facial asymmetry (Cranial nerve 7 motor function), No gaze palsy      Skin: No significant exanthematous lesions or discoloration noted on facial skin      Psychiatric: Normal affect, No hallucinations              Results:         8/30/21 breast MRI   Enhancement 3.2 cm lateral central aspect of right breast; 0.5 cm focus  1 cm medial to the known malignancy; no evident LAD      6/11/18 dexa   Normal   L spine T 0.4   L fem neck T -0.9   R fem neck T -0.8    7/12/22 dexa  Osteopenia  L1-4 T 0.8  L fem neck T -1.1  R fem neck T -1.2         Records reviewed and summarized above.   Pathology report(s) reviewed above.   Radiology report(s) reviewed above.             Assessment/plan:     1. Right breast IDC, gr 2, ER +, [place] +, HER 2 negative, 4 mm,0/5 LN,  pT1a pN0 cM0, stage IA, both anatomic and prognostic      We explained to the patient that the goal of systemic adjuvant therapy is to improve the chances for cure and decrease the risk of relapse. We explained why a patient can have microscopic cancer  spread now even though physical examination, laboratory studies and imaging studies are negative for cancer. We explained that the same treatments used now as adjuvant or preventive treatments rarely if ever are curative in women who develop metastases.       Using eprognosis.org, her 5 year all cause mortality risk is 6-8%; her 10 year all cause mortality risk is 15-23%; her median OS is 17-21 years. An adjuvant discussion is warranted.      ZULEMA, continue anastrozole 1 mg daily.  Mild joint pain, tolerable      Mammogram in 7/24/24 with  Dr. Diego, negative per patient.      Follow-up after early breast cancer was discussed. I recommend follow-up as defined  by the American Society of Clinical Oncology and [facility]. This includes a visit to a health care professional every  3-6 months for 3 years, then every 6-12

## 2025-01-06 NOTE — TELEPHONE ENCOUNTER
Patient called to see if the office is open today, I let her know we have delayed our opening to 10am due to the weather and if anything changes before her appointment time we will give her a call. She had no further questions.

## 2025-03-25 ENCOUNTER — TELEPHONE (OUTPATIENT)
Age: 70
End: 2025-03-25

## 2025-03-25 NOTE — TELEPHONE ENCOUNTER
Patient called and stated that she needed to reschedule her appt due to her being out of town during that time. Pt rescheduled to 10/8 at 8:45.